# Patient Record
Sex: FEMALE | Race: WHITE | NOT HISPANIC OR LATINO | URBAN - NONMETROPOLITAN AREA
[De-identification: names, ages, dates, MRNs, and addresses within clinical notes are randomized per-mention and may not be internally consistent; named-entity substitution may affect disease eponyms.]

---

## 2020-06-25 ENCOUNTER — OFFICE VISIT (OUTPATIENT)
Dept: URBAN - NONMETROPOLITAN AREA CLINIC 13 | Facility: CLINIC | Age: 65
End: 2020-06-25

## 2020-06-25 ENCOUNTER — OFFICE VISIT (OUTPATIENT)
Dept: URBAN - NONMETROPOLITAN AREA CLINIC 13 | Facility: CLINIC | Age: 65
End: 2020-06-25
Payer: MEDICARE

## 2020-06-25 DIAGNOSIS — K57.32 DIVERTICULITIS, COLON: ICD-10-CM

## 2020-06-25 DIAGNOSIS — R19.7 DIARRHEA: ICD-10-CM

## 2020-06-25 DIAGNOSIS — Z83.71 FAMILY HISTORY OF COLONIC POLYPS: ICD-10-CM

## 2020-06-25 DIAGNOSIS — K21.9 GERD (GASTROESOPHAGEAL REFLUX DISEASE): ICD-10-CM

## 2020-06-25 PROCEDURE — G8427 DOCREV CUR MEDS BY ELIG CLIN: HCPCS | Performed by: INTERNAL MEDICINE

## 2020-06-25 PROCEDURE — 99213 OFFICE O/P EST LOW 20 MIN: CPT | Performed by: INTERNAL MEDICINE

## 2020-06-25 PROCEDURE — G8417 CALC BMI ABV UP PARAM F/U: HCPCS | Performed by: INTERNAL MEDICINE

## 2020-06-25 PROCEDURE — G9903 PT SCRN TBCO ID AS NON USER: HCPCS | Performed by: INTERNAL MEDICINE

## 2020-06-25 PROCEDURE — 1036F TOBACCO NON-USER: CPT | Performed by: INTERNAL MEDICINE

## 2020-06-25 PROCEDURE — 3017F COLORECTAL CA SCREEN DOC REV: CPT | Performed by: INTERNAL MEDICINE

## 2020-06-25 RX ORDER — FAMOTIDINE 40 MG/1
TAKE 1 TABLET (40 MG) BY ORAL ROUTE ONCE DAILY AT BEDTIME FOR 90 DAYS TABLET, FILM COATED ORAL 1
Qty: 90 | Refills: 3 | Status: ACTIVE | COMMUNITY
Start: 2019-11-14 | End: 2020-11-08

## 2020-06-25 RX ORDER — DEXLANSOPRAZOLE 60 MG/1
TAKE 1 CAPSULE (60 MG) BY ORAL ROUTE ONCE DAILY FOR 90 DAYS CAPSULE, DELAYED RELEASE ORAL 1
Qty: 90 | Refills: 3 | Status: ACTIVE | COMMUNITY
Start: 2019-11-14 | End: 2020-11-08

## 2020-06-25 RX ORDER — FLUCONAZOLE 200 MG/1
TAKE 1 TABLET (200 MG) BY ORAL ROUTE ONCE DAILY TABLET ORAL 1
Qty: 0 | Refills: 0 | Status: ACTIVE | COMMUNITY
Start: 1900-01-01

## 2020-06-25 RX ORDER — ESTRADIOL 2 MG/1
TAKE 1/2 TABLET (2 MG) BY ORAL ROUTE ONCE DAILY BID TABLET ORAL 1
Qty: 0 | Refills: 0 | Status: ACTIVE | COMMUNITY
Start: 1900-01-01

## 2020-06-25 NOTE — HPI-TODAY'S VISIT:
Patient returns for follow-up of recurrent small bowel bacterial overgrowth that response to treatment with Xifaxan.  She keeps a prescription of Xifaxan on hand and treats as needed.  She has had 2 episodes in the last 6 months.  Her last episode was recently and she finished the Xifaxan last Sunday. In between episodes her bowel movements are normal.  She has solid formed stool on a daily basis.  She has no change in her bowel habits.  She has no bleeding.  She denies abdominal pain. Reflux has been controlled with Dexilant and Pepcid at night.  She has no chest pain.  She denies dysphagia.  She denies extra esophageal symptoms.  There is no abdominal pain, nausea or vomiting.  Her appetite and weight are stable. She has a recent history of iron deficiency and has been treated with iron.

## 2020-11-02 ENCOUNTER — ERX REFILL RESPONSE (OUTPATIENT)
Age: 65
End: 2020-11-02

## 2020-11-02 RX ORDER — FAMOTIDINE 20 MG/1
TAKE 2 TABLETS (40 MG) AT BEDTIME TABLET, FILM COATED ORAL
Qty: 180 | Refills: 2

## 2020-12-01 ENCOUNTER — ERX REFILL RESPONSE (OUTPATIENT)
Age: 65
End: 2020-12-01

## 2020-12-01 RX ORDER — DEXLANSOPRAZOLE 60 MG/1
TAKE 1 CAPSULE ONCE DAILY CAPSULE, DELAYED RELEASE ORAL
Qty: 90 CAPSULES | Refills: 1

## 2021-05-03 ENCOUNTER — ERX REFILL RESPONSE (OUTPATIENT)
Dept: URBAN - NONMETROPOLITAN AREA CLINIC 2 | Facility: CLINIC | Age: 66
End: 2021-05-03

## 2021-05-03 RX ORDER — DEXLANSOPRAZOLE 60 MG/1
TAKE 1 CAPSULE ONCE DAILY CAPSULE, DELAYED RELEASE ORAL
Qty: 90 CAPSULES | Refills: 0

## 2021-06-15 ENCOUNTER — ERX REFILL RESPONSE (OUTPATIENT)
Dept: URBAN - NONMETROPOLITAN AREA CLINIC 2 | Facility: CLINIC | Age: 66
End: 2021-06-15

## 2021-06-15 RX ORDER — FAMOTIDINE 20 MG/1
TAKE 2 TABLETS (40 MG) AT BEDTIME TABLET, FILM COATED ORAL
Qty: 180 | Refills: 2

## 2021-09-07 ENCOUNTER — ERX REFILL RESPONSE (OUTPATIENT)
Dept: URBAN - NONMETROPOLITAN AREA CLINIC 2 | Facility: CLINIC | Age: 66
End: 2021-09-07

## 2021-09-07 RX ORDER — DEXLANSOPRAZOLE 60 MG/1
TAKE 1 CAPSULE BY MOUTH ONCE DAILY CAPSULE, DELAYED RELEASE ORAL
Qty: 90 EACH | Refills: 1 | OUTPATIENT

## 2021-09-07 RX ORDER — DEXLANSOPRAZOLE 60 MG/1
TAKE 1 CAPSULE ONCE DAILY CAPSULE, DELAYED RELEASE ORAL
Qty: 90 CAPSULES | Refills: 0 | OUTPATIENT

## 2021-09-16 ENCOUNTER — OFFICE VISIT (OUTPATIENT)
Dept: URBAN - NONMETROPOLITAN AREA CLINIC 13 | Facility: CLINIC | Age: 66
End: 2021-09-16
Payer: MEDICARE

## 2021-09-16 ENCOUNTER — WEB ENCOUNTER (OUTPATIENT)
Dept: URBAN - NONMETROPOLITAN AREA CLINIC 13 | Facility: CLINIC | Age: 66
End: 2021-09-16

## 2021-09-16 DIAGNOSIS — K21.9 GASTROESOPHAGEAL REFLUX: ICD-10-CM

## 2021-09-16 DIAGNOSIS — K57.10 ILEAL DIVERTICULUM: ICD-10-CM

## 2021-09-16 DIAGNOSIS — R19.7 DIARRHEA, UNSPECIFIED TYPE: ICD-10-CM

## 2021-09-16 DIAGNOSIS — K76.89 LIVER CYST: ICD-10-CM

## 2021-09-16 DIAGNOSIS — K58.9 IBS (IRRITABLE BOWEL SYNDROME): ICD-10-CM

## 2021-09-16 DIAGNOSIS — K57.92 DIVERTICULITIS: ICD-10-CM

## 2021-09-16 DIAGNOSIS — R10.9 ABDOMINAL PAIN: ICD-10-CM

## 2021-09-16 DIAGNOSIS — Z83.71 FAMILY HISTORY OF COLONIC POLYPS: ICD-10-CM

## 2021-09-16 DIAGNOSIS — K57.90 DIVERTICULOSIS: ICD-10-CM

## 2021-09-16 PROCEDURE — 99214 OFFICE O/P EST MOD 30 MIN: CPT | Performed by: INTERNAL MEDICINE

## 2021-09-16 RX ORDER — DEXLANSOPRAZOLE 60 MG/1
TAKE 1 CAPSULE BY MOUTH ONCE DAILY CAPSULE, DELAYED RELEASE ORAL
Qty: 90 EACH | Refills: 1 | Status: ACTIVE | COMMUNITY

## 2021-09-16 RX ORDER — ESTRADIOL 2 MG/1
TAKE 1/2 TABLET (2 MG) BY ORAL ROUTE ONCE DAILY BID TABLET ORAL 1
Qty: 0 | Refills: 0 | Status: ACTIVE | COMMUNITY
Start: 1900-01-01

## 2021-09-16 RX ORDER — FLUCONAZOLE 200 MG/1
TAKE 1 TABLET (200 MG) BY ORAL ROUTE ONCE DAILY TABLET ORAL 1
Qty: 0 | Refills: 0 | Status: ACTIVE | COMMUNITY
Start: 1900-01-01

## 2021-09-16 RX ORDER — FAMOTIDINE 20 MG/1
TAKE 2 TABLETS (40 MG) AT BEDTIME TABLET, FILM COATED ORAL
Qty: 180 | Refills: 2 | Status: ACTIVE | COMMUNITY

## 2021-09-16 NOTE — HPI-TODAY'S VISIT:
Patient returns for her annual follow-up for IBS-D.  Patient states that she added Florastor to the align and has done much better this year.  She states she has not had to take the Xifaxan at all.  She is currently having a bowel movement daily.  Stools can be loose and somewhat urgent at times.  She is taking Metamucil daily.  She is noted that if she eats "spicy" foods or high fat foods she will develop diarrhea.  She has not had her gallbladder out.  Previous small bowel imaging shows ileal diverticula.  She is status post sigmoid resection for diverticulitis. She has no abdominal pain.  She denies reflux symptoms.  Her appetite and weight are stable.

## 2021-09-24 ENCOUNTER — ERX REFILL RESPONSE (OUTPATIENT)
Dept: URBAN - NONMETROPOLITAN AREA CLINIC 2 | Facility: CLINIC | Age: 66
End: 2021-09-24

## 2021-09-24 RX ORDER — DEXLANSOPRAZOLE 60 MG/1
TAKE 1 CAPSULE BY MOUTH ONCE DAILY CAPSULE, DELAYED RELEASE ORAL
Qty: 90 EACH | Refills: 4 | OUTPATIENT

## 2021-09-24 RX ORDER — DEXLANSOPRAZOLE 60 MG/1
TAKE 1 CAPSULE BY MOUTH ONCE DAILY CAPSULE, DELAYED RELEASE ORAL
Qty: 90 EACH | Refills: 1 | OUTPATIENT

## 2022-03-07 ENCOUNTER — ERX REFILL RESPONSE (OUTPATIENT)
Dept: URBAN - NONMETROPOLITAN AREA CLINIC 2 | Facility: CLINIC | Age: 67
End: 2022-03-07

## 2022-03-07 RX ORDER — FAMOTIDINE 20 MG/1
TAKE 2 TABLETS (40 MG) AT BEDTIME TABLET, FILM COATED ORAL
Qty: 180 | Refills: 2 | OUTPATIENT

## 2022-03-07 RX ORDER — FAMOTIDINE 20 MG/1
TAKE 2 TABLETS BY MOUTH AT BEDTIME TABLET, FILM COATED ORAL
Qty: 180 TABLET | Refills: 3 | OUTPATIENT

## 2022-03-18 ENCOUNTER — TELEPHONE ENCOUNTER (OUTPATIENT)
Dept: URBAN - NONMETROPOLITAN AREA CLINIC 13 | Facility: CLINIC | Age: 67
End: 2022-03-18

## 2022-03-18 RX ORDER — RIFAXIMIN 550 MG/1
1 TABLET TABLET ORAL THREE TIMES A DAY
Qty: 42 TABLET | Refills: 3 | OUTPATIENT
Start: 2022-03-21 | End: 2022-05-16

## 2022-06-22 ENCOUNTER — CLAIMS CREATED FROM THE CLAIM WINDOW (OUTPATIENT)
Dept: URBAN - NONMETROPOLITAN AREA CLINIC 2 | Facility: CLINIC | Age: 67
End: 2022-06-22
Payer: MEDICARE

## 2022-06-22 VITALS
BODY MASS INDEX: 24.66 KG/M2 | HEART RATE: 74 BPM | HEIGHT: 65 IN | WEIGHT: 148 LBS | SYSTOLIC BLOOD PRESSURE: 158 MMHG | DIASTOLIC BLOOD PRESSURE: 93 MMHG

## 2022-06-22 DIAGNOSIS — Z83.71 FAMILY HISTORY OF COLONIC POLYPS: ICD-10-CM

## 2022-06-22 DIAGNOSIS — K57.10 ILEAL DIVERTICULUM: ICD-10-CM

## 2022-06-22 DIAGNOSIS — K21.9 GASTROESOPHAGEAL REFLUX: ICD-10-CM

## 2022-06-22 DIAGNOSIS — K57.90 DIVERTICULOSIS: ICD-10-CM

## 2022-06-22 DIAGNOSIS — R10.11 RUQ ABDOMINAL PAIN: ICD-10-CM

## 2022-06-22 DIAGNOSIS — K57.92 DIVERTICULITIS: ICD-10-CM

## 2022-06-22 DIAGNOSIS — R19.7 DIARRHEA, UNSPECIFIED TYPE: ICD-10-CM

## 2022-06-22 DIAGNOSIS — K76.89 LIVER CYST: ICD-10-CM

## 2022-06-22 DIAGNOSIS — R10.9 ABDOMINAL PAIN: ICD-10-CM

## 2022-06-22 DIAGNOSIS — K58.9 IBS (IRRITABLE BOWEL SYNDROME): ICD-10-CM

## 2022-06-22 PROCEDURE — 99214 OFFICE O/P EST MOD 30 MIN: CPT | Performed by: NURSE PRACTITIONER

## 2022-06-22 RX ORDER — FAMOTIDINE 20 MG/1
TAKE 2 TABLETS BY MOUTH AT BEDTIME TABLET, FILM COATED ORAL
Qty: 180 TABLET | Refills: 3 | Status: ACTIVE | COMMUNITY

## 2022-06-22 RX ORDER — FLUCONAZOLE 200 MG/1
TAKE 1 TABLET (200 MG) BY ORAL ROUTE ONCE DAILY TABLET ORAL 1
Qty: 0 | Refills: 0 | Status: ACTIVE | COMMUNITY
Start: 1900-01-01

## 2022-06-22 RX ORDER — HYOSCYAMINE SULFATE 0.125 MG
1 TABLET ON THE TONGUE AND ALLOW TO DISSOLVE  AS NEEDED FOR ABDOMINAL PAIN TABLET,DISINTEGRATING ORAL
Qty: 30 LOZENGE | Refills: 6 | OUTPATIENT
Start: 2022-06-22 | End: 2023-01-17

## 2022-06-22 RX ORDER — ESTRADIOL 2 MG/1
TAKE 1/2 TABLET (2 MG) BY ORAL ROUTE ONCE DAILY BID TABLET ORAL 1
Qty: 0 | Refills: 0 | Status: ACTIVE | COMMUNITY
Start: 1900-01-01

## 2022-06-22 RX ORDER — DEXLANSOPRAZOLE 60 MG/1
TAKE 1 CAPSULE BY MOUTH ONCE DAILY CAPSULE, DELAYED RELEASE ORAL
Qty: 90 EACH | Refills: 4 | Status: ACTIVE | COMMUNITY

## 2022-06-22 NOTE — HPI-TODAY'S VISIT:
9/21/21 Patient returns for her annual follow-up for IBS-D.  Patient states that she added Florastor to the align and has done much better this year.  She states she has not had to take the Xifaxan at all.  She is currently having a bowel movement daily.  Stools can be loose and somewhat urgent at times.  She is taking Metamucil daily.  She is noted that if she eats "spicy" foods or high fat foods she will develop diarrhea.  She has not had her gallbladder out.  Previous small bowel imaging shows ileal diverticula.  She is status post sigmoid resection for diverticulitis. She has no abdominal pain.  She denies reflux symptoms.  Her appetite and weight are stable.  6/22/2022 Ms. Monique is a 67-year-old female previously followed by Dr. Rubio for IBS D and diverticular disease.  She presents today with acute onset 5-hour episode of right upper quad abdominal pain.  It is currently relieved, but she is very fearful of this returning as it was very sharp when it occurred.  She wonders about gallbladder disease as she has frequent intolerance to high-fat foods.  She has had a gallbladder ultrasound before and has been unrevealing sb

## 2022-06-27 ENCOUNTER — TELEPHONE ENCOUNTER (OUTPATIENT)
Dept: URBAN - METROPOLITAN AREA CLINIC 92 | Facility: CLINIC | Age: 67
End: 2022-06-27

## 2022-06-27 RX ORDER — DICYCLOMINE HYDROCHLORIDE 10 MG/1
1 CAPSULES CAPSULE ORAL
Qty: 90 CAPSULE | Refills: 3 | OUTPATIENT

## 2022-08-17 ENCOUNTER — OFFICE VISIT (OUTPATIENT)
Dept: URBAN - NONMETROPOLITAN AREA CLINIC 2 | Facility: CLINIC | Age: 67
End: 2022-08-17
Payer: MEDICARE

## 2022-08-17 VITALS
HEART RATE: 75 BPM | TEMPERATURE: 97 F | SYSTOLIC BLOOD PRESSURE: 142 MMHG | BODY MASS INDEX: 24.99 KG/M2 | WEIGHT: 150 LBS | HEIGHT: 65 IN | DIASTOLIC BLOOD PRESSURE: 90 MMHG

## 2022-08-17 DIAGNOSIS — K57.92 DIVERTICULITIS: ICD-10-CM

## 2022-08-17 DIAGNOSIS — R10.9 ABDOMINAL PAIN: ICD-10-CM

## 2022-08-17 DIAGNOSIS — R10.11 RUQ ABDOMINAL PAIN: ICD-10-CM

## 2022-08-17 DIAGNOSIS — R19.7 DIARRHEA, UNSPECIFIED TYPE: ICD-10-CM

## 2022-08-17 DIAGNOSIS — Z83.71 FAMILY HISTORY OF COLONIC POLYPS: ICD-10-CM

## 2022-08-17 DIAGNOSIS — K57.10 ILEAL DIVERTICULUM: ICD-10-CM

## 2022-08-17 DIAGNOSIS — K57.90 DIVERTICULOSIS: ICD-10-CM

## 2022-08-17 DIAGNOSIS — K76.89 LIVER CYST: ICD-10-CM

## 2022-08-17 DIAGNOSIS — K58.9 IBS (IRRITABLE BOWEL SYNDROME): ICD-10-CM

## 2022-08-17 DIAGNOSIS — K21.9 GASTROESOPHAGEAL REFLUX: ICD-10-CM

## 2022-08-17 PROBLEM — 62315008: Status: ACTIVE | Noted: 2021-09-16

## 2022-08-17 PROCEDURE — 99213 OFFICE O/P EST LOW 20 MIN: CPT | Performed by: NURSE PRACTITIONER

## 2022-08-17 RX ORDER — DEXLANSOPRAZOLE 60 MG/1
TAKE 1 CAPSULE BY MOUTH ONCE DAILY CAPSULE, DELAYED RELEASE ORAL
Qty: 90 EACH | Refills: 4 | Status: ACTIVE | COMMUNITY

## 2022-08-17 RX ORDER — ESTRADIOL 2 MG/1
TAKE 1/2 TABLET (2 MG) BY ORAL ROUTE ONCE DAILY BID TABLET ORAL 1
Qty: 0 | Refills: 0 | Status: ACTIVE | COMMUNITY
Start: 1900-01-01

## 2022-08-17 RX ORDER — FAMOTIDINE 20 MG/1
TAKE 2 TABLETS BY MOUTH AT BEDTIME TABLET, FILM COATED ORAL
Qty: 180 TABLET | Refills: 3

## 2022-08-17 RX ORDER — FAMOTIDINE 20 MG/1
TAKE 2 TABLETS BY MOUTH AT BEDTIME TABLET, FILM COATED ORAL
Qty: 180 TABLET | Refills: 3 | Status: ACTIVE | COMMUNITY

## 2022-08-17 RX ORDER — PANTOPRAZOLE SODIUM 40 MG/1
1 TABLET TABLET, DELAYED RELEASE ORAL TWICE DAILY
Qty: 60 TABLET | Refills: 6 | OUTPATIENT
Start: 2022-08-17

## 2022-08-17 RX ORDER — DEXLANSOPRAZOLE 60 MG/1
TAKE 1 CAPSULE ONCE DAILY CAPSULE, DELAYED RELEASE ORAL
Qty: 90 CAPSULES | Refills: 1 | Status: DISCONTINUED | COMMUNITY

## 2022-08-17 RX ORDER — FAMOTIDINE 20 MG/1
TAKE 2 TABLETS (40 MG) AT BEDTIME TABLET, FILM COATED ORAL
Qty: 180 | Refills: 2 | Status: DISCONTINUED | COMMUNITY

## 2022-08-17 RX ORDER — HYOSCYAMINE SULFATE 0.125 MG
1 TABLET ON THE TONGUE AND ALLOW TO DISSOLVE  AS NEEDED FOR ABDOMINAL PAIN TABLET,DISINTEGRATING ORAL
Qty: 30 LOZENGE | Refills: 6 | OUTPATIENT

## 2022-08-17 RX ORDER — FLUCONAZOLE 200 MG/1
TAKE 1 TABLET (200 MG) BY ORAL ROUTE ONCE DAILY TABLET ORAL 1
Qty: 0 | Refills: 0 | Status: ACTIVE | COMMUNITY
Start: 1900-01-01

## 2022-08-17 RX ORDER — HYOSCYAMINE SULFATE 0.125 MG
1 TABLET ON THE TONGUE AND ALLOW TO DISSOLVE  AS NEEDED FOR ABDOMINAL PAIN TABLET,DISINTEGRATING ORAL
Qty: 30 LOZENGE | Refills: 6 | Status: ACTIVE | COMMUNITY
Start: 2022-06-22 | End: 2023-01-17

## 2022-08-17 RX ORDER — DICYCLOMINE HYDROCHLORIDE 10 MG/1
1 CAPSULES CAPSULE ORAL
Qty: 90 CAPSULE | Refills: 3 | Status: ACTIVE | COMMUNITY

## 2022-08-17 RX ORDER — DEXLANSOPRAZOLE 60 MG/1
TAKE 1 CAPSULE BY MOUTH ONCE DAILY CAPSULE, DELAYED RELEASE ORAL
Qty: 90 EACH | Refills: 1 | Status: DISCONTINUED | COMMUNITY

## 2022-08-17 NOTE — HPI-TODAY'S VISIT:
9/21/21 Patient returns for her annual follow-up for IBS-D.  Patient states that she added Florastor to the align and has done much better this year.  She states she has not had to take the Xifaxan at all.  She is currently having a bowel movement daily.  Stools can be loose and somewhat urgent at times.  She is taking Metamucil daily.  She is noted that if she eats "spicy" foods or high fat foods she will develop diarrhea.  She has not had her gallbladder out.  Previous small bowel imaging shows ileal diverticula.  She is status post sigmoid resection for diverticulitis. She has no abdominal pain.  She denies reflux symptoms.  Her appetite and weight are stable.  8/17/22 Ms. Monique is a 67-year-old female previously followed by Dr. Rubio for IBS D and diverticular disease.  She presents today with acute onset 5-hour episode of right upper quad abdominal pain.  It is currently relieved, but she is very fearful of this returning as it was very sharp when it occurred.    She has had a gallbladder ultrasound before and has been unrevealing. 2022 HIDA 92%. sb

## 2022-11-17 ENCOUNTER — TELEPHONE ENCOUNTER (OUTPATIENT)
Dept: URBAN - METROPOLITAN AREA CLINIC 92 | Facility: CLINIC | Age: 67
End: 2022-11-17

## 2022-11-17 RX ORDER — DEXLANSOPRAZOLE 60 MG/1
TAKE 1 CAPSULE BY MOUTH ONCE DAILY CAPSULE, DELAYED RELEASE ORAL
Qty: 90 EACH | Refills: 4

## 2023-01-18 ENCOUNTER — OFFICE VISIT (OUTPATIENT)
Dept: URBAN - NONMETROPOLITAN AREA CLINIC 2 | Facility: CLINIC | Age: 68
End: 2023-01-18
Payer: MEDICARE

## 2023-01-18 VITALS
WEIGHT: 150 LBS | SYSTOLIC BLOOD PRESSURE: 169 MMHG | BODY MASS INDEX: 24.99 KG/M2 | TEMPERATURE: 97.4 F | DIASTOLIC BLOOD PRESSURE: 98 MMHG | HEIGHT: 65 IN | HEART RATE: 80 BPM

## 2023-01-18 DIAGNOSIS — K57.90 DIVERTICULOSIS: ICD-10-CM

## 2023-01-18 DIAGNOSIS — K76.89 LIVER CYST: ICD-10-CM

## 2023-01-18 DIAGNOSIS — R10.9 ABDOMINAL PAIN: ICD-10-CM

## 2023-01-18 DIAGNOSIS — R19.7 DIARRHEA, UNSPECIFIED TYPE: ICD-10-CM

## 2023-01-18 DIAGNOSIS — K58.9 IBS (IRRITABLE BOWEL SYNDROME): ICD-10-CM

## 2023-01-18 DIAGNOSIS — K57.10 ILEAL DIVERTICULUM: ICD-10-CM

## 2023-01-18 DIAGNOSIS — Z83.71 FAMILY HISTORY OF COLONIC POLYPS: ICD-10-CM

## 2023-01-18 DIAGNOSIS — K57.92 DIVERTICULITIS: ICD-10-CM

## 2023-01-18 DIAGNOSIS — K21.9 GASTROESOPHAGEAL REFLUX: ICD-10-CM

## 2023-01-18 DIAGNOSIS — R10.11 RUQ ABDOMINAL PAIN: ICD-10-CM

## 2023-01-18 PROBLEM — 197085005: Status: ACTIVE | Noted: 2021-09-16

## 2023-01-18 PROBLEM — 85057007: Status: ACTIVE | Noted: 2021-09-16

## 2023-01-18 PROCEDURE — 99214 OFFICE O/P EST MOD 30 MIN: CPT | Performed by: INTERNAL MEDICINE

## 2023-01-18 RX ORDER — DEXLANSOPRAZOLE 60 MG/1
TAKE 1 CAPSULE BY MOUTH ONCE DAILY CAPSULE, DELAYED RELEASE ORAL
Qty: 90 EACH | Refills: 4 | Status: DISCONTINUED | COMMUNITY

## 2023-01-18 RX ORDER — FAMOTIDINE 20 MG/1
TAKE 2 TABLETS BY MOUTH AT BEDTIME TABLET, FILM COATED ORAL
Qty: 180 TABLET | Refills: 3

## 2023-01-18 RX ORDER — FLUCONAZOLE 200 MG/1
TAKE 1 TABLET (200 MG) BY ORAL ROUTE ONCE DAILY TABLET ORAL 1
Qty: 0 | Refills: 0 | Status: ACTIVE | COMMUNITY
Start: 1900-01-01

## 2023-01-18 RX ORDER — DICYCLOMINE HYDROCHLORIDE 10 MG/1
1 CAPSULE CAPSULE ORAL THREE TIMES A DAY
Qty: 90 | Refills: 3 | OUTPATIENT
Start: 2023-01-18 | End: 2023-05-18

## 2023-01-18 RX ORDER — PANTOPRAZOLE SODIUM 40 MG/1
1 TABLET TABLET, DELAYED RELEASE ORAL TWICE DAILY
Qty: 60 TABLET | Refills: 6 | OUTPATIENT

## 2023-01-18 RX ORDER — DICYCLOMINE HYDROCHLORIDE 10 MG/1
1 CAPSULES CAPSULE ORAL
Qty: 90 CAPSULE | Refills: 3 | Status: DISCONTINUED | COMMUNITY

## 2023-01-18 RX ORDER — HYOSCYAMINE SULFATE 0.125 MG
1 TABLET ON THE TONGUE AND ALLOW TO DISSOLVE  AS NEEDED FOR ABDOMINAL PAIN TABLET,DISINTEGRATING ORAL
Qty: 30 LOZENGE | Refills: 6 | Status: DISCONTINUED | COMMUNITY

## 2023-01-18 RX ORDER — PANTOPRAZOLE SODIUM 40 MG/1
1 TABLET TABLET, DELAYED RELEASE ORAL TWICE DAILY
Qty: 60 TABLET | Refills: 6 | Status: ACTIVE | COMMUNITY
Start: 2022-08-17

## 2023-01-18 RX ORDER — ZOLPIDEM TARTRATE 10 MG/1
1 TABLET AT BEDTIME AS NEEDED TABLET, FILM COATED ORAL ONCE A DAY
Status: ACTIVE | COMMUNITY

## 2023-01-18 RX ORDER — FAMOTIDINE 20 MG/1
TAKE 2 TABLETS BY MOUTH AT BEDTIME TABLET, FILM COATED ORAL
Qty: 180 TABLET | Refills: 3 | Status: ACTIVE | COMMUNITY

## 2023-01-18 RX ORDER — ESTRADIOL 2 MG/1
TAKE 1/2 TABLET (2 MG) BY ORAL ROUTE ONCE DAILY BID TABLET ORAL 1
Qty: 0 | Refills: 0 | Status: ACTIVE | COMMUNITY
Start: 1900-01-01

## 2023-01-18 RX ORDER — CELECOXIB 200 MG/1
1 CAPSULE WITH FOOD CAPSULE ORAL ONCE A DAY
Status: ACTIVE | COMMUNITY

## 2023-01-18 RX ORDER — HYOSCYAMINE SULFATE 0.125 MG
1 TABLET ON THE TONGUE AND ALLOW TO DISSOLVE  AS NEEDED FOR ABDOMINAL PAIN TABLET,DISINTEGRATING ORAL
Qty: 30 LOZENGE | Refills: 6 | OUTPATIENT

## 2023-01-18 NOTE — HPI-TODAY'S VISIT:
9/21/21 Patient returns for her annual follow-up for IBS-D.  Patient states that she added Florastor to the align and has done much better this year.  She states she has not had to take the Xifaxan at all.  She is currently having a bowel movement daily.  Stools can be loose and somewhat urgent at times.  She is taking Metamucil daily.  She is noted that if she eats "spicy" foods or high fat foods she will develop diarrhea.  She has not had her gallbladder out.  Previous small bowel imaging shows ileal diverticula.  She is status post sigmoid resection for diverticulitis. She has no abdominal pain.  She denies reflux symptoms.  Her appetite and weight are stable.  8/17/22 Ms. Monique is a 67-year-old female previously followed by Dr. uRbio for IBS D and diverticular disease.  She presents today with acute onset 5-hour episode of right upper quad abdominal pain.  It is currently relieved, but she is very fearful of this returning as it was very sharp when it occurred.    She has had a gallbladder ultrasound before and has been unrevealing. 2022 HIDA 92%. sb  1/18/23: Ms. Monique returns for follow-up of RUQ abdominal pain and IBS-D.  Since her last clinic visit, she has been doing ok.  She has difficulties with post-prandial urgency.  Her RUQ pain has resolved itself.  She continues to take both pantoprazole BID and Pepcid and complains of waking up in the early AM with burning in her throat.

## 2023-02-07 PROBLEM — 235595009 GASTROESOPHAGEAL REFLUX DISEASE: Status: ACTIVE | Noted: 2023-02-07

## 2023-02-23 ENCOUNTER — OFFICE VISIT (OUTPATIENT)
Dept: URBAN - NONMETROPOLITAN AREA SURGERY CENTER 1 | Facility: SURGERY CENTER | Age: 68
End: 2023-02-23
Payer: MEDICARE

## 2023-02-23 ENCOUNTER — CLAIMS CREATED FROM THE CLAIM WINDOW (OUTPATIENT)
Dept: URBAN - METROPOLITAN AREA CLINIC 4 | Facility: CLINIC | Age: 68
End: 2023-02-23
Payer: MEDICARE

## 2023-02-23 DIAGNOSIS — K31.7 BENIGN GASTRIC POLYP: ICD-10-CM

## 2023-02-23 DIAGNOSIS — K21.9 GASTRO-ESOPHAGEAL REFLUX DISEASE WITHOUT ESOPHAGITIS: ICD-10-CM

## 2023-02-23 DIAGNOSIS — K31.7 POLYP OF STOMACH AND DUODENUM: ICD-10-CM

## 2023-02-23 DIAGNOSIS — K21.9 ACID REFLUX: ICD-10-CM

## 2023-02-23 PROCEDURE — 88305 TISSUE EXAM BY PATHOLOGIST: CPT | Performed by: PATHOLOGY

## 2023-02-23 PROCEDURE — G8907 PT DOC NO EVENTS ON DISCHARG: HCPCS | Performed by: INTERNAL MEDICINE

## 2023-02-23 PROCEDURE — 88313 SPECIAL STAINS GROUP 2: CPT | Performed by: PATHOLOGY

## 2023-02-23 PROCEDURE — 43239 EGD BIOPSY SINGLE/MULTIPLE: CPT | Performed by: INTERNAL MEDICINE

## 2023-02-23 PROCEDURE — 88312 SPECIAL STAINS GROUP 1: CPT | Performed by: PATHOLOGY

## 2023-03-14 ENCOUNTER — TELEPHONE ENCOUNTER (OUTPATIENT)
Dept: URBAN - NONMETROPOLITAN AREA CLINIC 2 | Facility: CLINIC | Age: 68
End: 2023-03-14

## 2023-03-14 RX ORDER — PANTOPRAZOLE SODIUM 40 MG/1
1 TABLET TABLET, DELAYED RELEASE ORAL TWICE DAILY
Qty: 60 TABLET | Refills: 6

## 2023-05-26 ENCOUNTER — CLAIMS CREATED FROM THE CLAIM WINDOW (OUTPATIENT)
Dept: URBAN - NONMETROPOLITAN AREA CLINIC 2 | Facility: CLINIC | Age: 68
End: 2023-05-26
Payer: MEDICARE

## 2023-05-26 VITALS
TEMPERATURE: 97.7 F | DIASTOLIC BLOOD PRESSURE: 108 MMHG | SYSTOLIC BLOOD PRESSURE: 169 MMHG | HEIGHT: 65 IN | BODY MASS INDEX: 24.99 KG/M2 | HEART RATE: 78 BPM | WEIGHT: 150 LBS

## 2023-05-26 DIAGNOSIS — R10.9 ABDOMINAL PAIN: ICD-10-CM

## 2023-05-26 DIAGNOSIS — K21.9 GASTROESOPHAGEAL REFLUX DISEASE WITHOUT ESOPHAGITIS: ICD-10-CM

## 2023-05-26 DIAGNOSIS — K76.89 LIVER CYST: ICD-10-CM

## 2023-05-26 DIAGNOSIS — R10.11 RUQ ABDOMINAL PAIN: ICD-10-CM

## 2023-05-26 DIAGNOSIS — K58.9 IBS (IRRITABLE BOWEL SYNDROME): ICD-10-CM

## 2023-05-26 DIAGNOSIS — Z83.71 FAMILY HISTORY OF COLONIC POLYPS: ICD-10-CM

## 2023-05-26 DIAGNOSIS — R10.84 ABDOMINAL CRAMPING, GENERALIZED: ICD-10-CM

## 2023-05-26 PROBLEM — 266435005: Status: ACTIVE | Noted: 2023-05-26

## 2023-05-26 PROCEDURE — 99214 OFFICE O/P EST MOD 30 MIN: CPT | Performed by: NURSE PRACTITIONER

## 2023-05-26 RX ORDER — DICYCLOMINE HYDROCHLORIDE 10 MG/1
1 CAPSULES CAPSULE ORAL THREE TIMES A DAY
Qty: 90 CAPSULE | Refills: 3 | OUTPATIENT
Start: 2023-05-26 | End: 2023-09-23

## 2023-05-26 RX ORDER — PANTOPRAZOLE SODIUM 40 MG/1
1 TABLET TABLET, DELAYED RELEASE ORAL ONCE A DAY
Qty: 90 TABLET | Refills: 3 | OUTPATIENT
Start: 2023-05-26

## 2023-05-26 RX ORDER — ESTRADIOL 2 MG/1
TAKE 1/2 TABLET (2 MG) BY ORAL ROUTE ONCE DAILY BID TABLET ORAL 1
Qty: 0 | Refills: 0 | Status: ACTIVE | COMMUNITY
Start: 1900-01-01

## 2023-05-26 RX ORDER — FAMOTIDINE 40 MG/1
1 TABLET TABLET, FILM COATED ORAL TWICE A DAY
Qty: 180 TABLET | Refills: 3 | OUTPATIENT
Start: 2023-05-26

## 2023-05-26 RX ORDER — FAMOTIDINE 20 MG/1
TAKE 2 TABLETS BY MOUTH AT BEDTIME TABLET, FILM COATED ORAL
Qty: 180 TABLET | Refills: 3 | Status: ACTIVE | COMMUNITY

## 2023-05-26 RX ORDER — FLUCONAZOLE 200 MG/1
TAKE 1 TABLET (200 MG) BY ORAL ROUTE ONCE DAILY TABLET ORAL 1
Qty: 0 | Refills: 0 | Status: ACTIVE | COMMUNITY
Start: 1900-01-01

## 2023-05-26 RX ORDER — PANTOPRAZOLE SODIUM 40 MG/1
1 TABLET TABLET, DELAYED RELEASE ORAL TWICE DAILY
Qty: 60 TABLET | Refills: 6 | Status: ACTIVE | COMMUNITY

## 2023-05-26 RX ORDER — ZOLPIDEM TARTRATE 10 MG/1
1 TABLET AT BEDTIME AS NEEDED TABLET, FILM COATED ORAL ONCE A DAY
Status: ACTIVE | COMMUNITY

## 2023-05-26 RX ORDER — HYOSCYAMINE SULFATE 0.125 MG
1 TABLET ON THE TONGUE AND ALLOW TO DISSOLVE  AS NEEDED FOR ABDOMINAL PAIN TABLET,DISINTEGRATING ORAL
Qty: 30 LOZENGE | Refills: 6 | Status: ACTIVE | COMMUNITY

## 2023-05-26 RX ORDER — CELECOXIB 200 MG/1
1 CAPSULE WITH FOOD CAPSULE ORAL ONCE A DAY
Status: ACTIVE | COMMUNITY

## 2023-05-26 NOTE — HPI-TODAY'S VISIT:
9/21/21 Patient returns for her annual follow-up for IBS-D.  Patient states that she added Florastor to the align and has done much better this year.  She states she has not had to take the Xifaxan at all.  She is currently having a bowel movement daily.  Stools can be loose and somewhat urgent at times.  She is taking Metamucil daily.  She is noted that if she eats "spicy" foods or high fat foods she will develop diarrhea.  She has not had her gallbladder out.  Previous small bowel imaging shows ileal diverticula.  She is status post sigmoid resection for diverticulitis. She has no abdominal pain.  She denies reflux symptoms.  Her appetite and weight are stable.  8/17/22 Ms. Monique is a 67-year-old female previously followed by Dr. Rubio for IBS D and diverticular disease.  She presents today with acute onset 5-hour episode of right upper quad abdominal pain.  It is currently relieved, but she is very fearful of this returning as it was very sharp when it occurred.    She has had a gallbladder ultrasound before and has been unrevealing. 2022 HIDA 92%. sb  1/18/23: Ms. Monique returns for follow-up of RUQ abdominal pain and IBS-D.  Since her last clinic visit, she has been doing ok.  She has difficulties with post-prandial urgency.  Her RUQ pain has resolved itself.  She continues to take both pantoprazole BID and Pepcid and complains of waking up in the early AM with burning in her throat.  3/23 EGD with 3 cm HH, salmon mucosa- but path didn't show barretts.  Today, Ms Monique returns for GERD. reports some nausea/indigestion in am. overall, doing well. worried about loose stool while on vacation. seems to be doing ok today. SB

## 2023-11-27 ENCOUNTER — OFFICE VISIT (OUTPATIENT)
Dept: URBAN - NONMETROPOLITAN AREA CLINIC 13 | Facility: CLINIC | Age: 68
End: 2023-11-27
Payer: MEDICARE

## 2023-11-27 ENCOUNTER — TELEPHONE ENCOUNTER (OUTPATIENT)
Dept: URBAN - NONMETROPOLITAN AREA CLINIC 2 | Facility: CLINIC | Age: 68
End: 2023-11-27

## 2023-11-27 ENCOUNTER — DASHBOARD ENCOUNTERS (OUTPATIENT)
Age: 68
End: 2023-11-27

## 2023-11-27 ENCOUNTER — ERX REFILL RESPONSE (OUTPATIENT)
Dept: URBAN - NONMETROPOLITAN AREA CLINIC 2 | Facility: CLINIC | Age: 68
End: 2023-11-27

## 2023-11-27 VITALS
SYSTOLIC BLOOD PRESSURE: 156 MMHG | HEIGHT: 65 IN | WEIGHT: 150 LBS | DIASTOLIC BLOOD PRESSURE: 62 MMHG | BODY MASS INDEX: 24.99 KG/M2 | TEMPERATURE: 98 F | HEART RATE: 64 BPM

## 2023-11-27 DIAGNOSIS — Z83.71 FAMILY HISTORY OF COLONIC POLYPS: ICD-10-CM

## 2023-11-27 DIAGNOSIS — K76.89 LIVER CYST: ICD-10-CM

## 2023-11-27 DIAGNOSIS — R10.84 ABDOMINAL CRAMPING, GENERALIZED: ICD-10-CM

## 2023-11-27 DIAGNOSIS — K58.8 OTHER IRRITABLE BOWEL SYNDROME: ICD-10-CM

## 2023-11-27 PROCEDURE — 99214 OFFICE O/P EST MOD 30 MIN: CPT | Performed by: NURSE PRACTITIONER

## 2023-11-27 RX ORDER — ZOLPIDEM TARTRATE 10 MG/1
1 TABLET AT BEDTIME AS NEEDED TABLET, FILM COATED ORAL ONCE A DAY
Status: ACTIVE | COMMUNITY

## 2023-11-27 RX ORDER — CELECOXIB 200 MG/1
1 CAPSULE WITH FOOD CAPSULE ORAL ONCE A DAY
Status: ACTIVE | COMMUNITY

## 2023-11-27 RX ORDER — PANTOPRAZOLE SODIUM 40 MG/1
1 TABLET TABLET, DELAYED RELEASE ORAL ONCE A DAY
Qty: 90 TABLET | Refills: 3 | OUTPATIENT

## 2023-11-27 RX ORDER — FLUCONAZOLE 200 MG/1
TAKE 1 TABLET (200 MG) BY ORAL ROUTE ONCE DAILY TABLET ORAL 1
Qty: 0 | Refills: 0 | Status: ACTIVE | COMMUNITY
Start: 1900-01-01

## 2023-11-27 RX ORDER — DICYCLOMINE HYDROCHLORIDE 10 MG/1
TAKE 1 CAPSULE BY MOUTH THREE TIMES DAILY CAPSULE ORAL
Qty: 90 CAPSULE | Refills: 4 | OUTPATIENT

## 2023-11-27 RX ORDER — ESTRADIOL 2 MG/1
TAKE 1/2 TABLET (2 MG) BY ORAL ROUTE ONCE DAILY BID TABLET ORAL 1
Qty: 0 | Refills: 0 | Status: ACTIVE | COMMUNITY
Start: 1900-01-01

## 2023-11-27 RX ORDER — FAMOTIDINE 20 MG/1
TAKE 2 TABLETS BY MOUTH AT BEDTIME TABLET, FILM COATED ORAL
Qty: 180 TABLET | Refills: 3 | Status: ACTIVE | COMMUNITY

## 2023-11-27 RX ORDER — PANTOPRAZOLE SODIUM 40 MG/1
1 TABLET TABLET, DELAYED RELEASE ORAL TWICE DAILY
Qty: 60 TABLET | Refills: 6 | Status: ACTIVE | COMMUNITY

## 2023-11-27 RX ORDER — PANTOPRAZOLE SODIUM 40 MG/1
1 TABLET TABLET, DELAYED RELEASE ORAL ONCE A DAY
Qty: 90 TABLET | Refills: 3 | Status: ACTIVE | COMMUNITY
Start: 2023-05-26

## 2023-11-27 RX ORDER — HYOSCYAMINE SULFATE 0.125 MG
1 TABLET ON THE TONGUE AND ALLOW TO DISSOLVE  AS NEEDED FOR ABDOMINAL PAIN TABLET,DISINTEGRATING ORAL
Qty: 30 LOZENGE | Refills: 6 | Status: ACTIVE | COMMUNITY

## 2023-11-27 RX ORDER — FAMOTIDINE 40 MG/1
1 TABLET TABLET, FILM COATED ORAL TWICE A DAY
Qty: 180 TABLET | Refills: 3 | Status: ACTIVE | COMMUNITY
Start: 2023-05-26

## 2023-11-27 RX ORDER — DICYCLOMINE HYDROCHLORIDE 10 MG/1
TAKE 1 CAPSULE BY MOUTH THREE TIMES DAILY CAPSULE ORAL
Qty: 90 CAPSULE | Refills: 3 | OUTPATIENT

## 2023-11-27 RX ORDER — FAMOTIDINE 40 MG/1
1 TABLET TABLET, FILM COATED ORAL TWICE A DAY
Qty: 180 TABLET | Refills: 3 | OUTPATIENT

## 2023-11-27 RX ORDER — RIFAXIMIN 550 MG/1
1 TABLET TABLET ORAL THREE TIMES A DAY
Qty: 42 TABLET | Refills: 0 | OUTPATIENT
Start: 2023-11-27 | End: 2023-12-11

## 2023-11-27 NOTE — HPI-TODAY'S VISIT:
9/21/21 Patient returns for her annual follow-up for IBS-D.  Patient states that she added Florastor to the align and has done much better this year.  She states she has not had to take the Xifaxan at all.  She is currently having a bowel movement daily.  Stools can be loose and somewhat urgent at times.  She is taking Metamucil daily.  She is noted that if she eats "spicy" foods or high fat foods she will develop diarrhea.  She has not had her gallbladder out.  Previous small bowel imaging shows ileal diverticula.  She is status post sigmoid resection for diverticulitis. She has no abdominal pain.  She denies reflux symptoms.  Her appetite and weight are stable.  8/17/22 Ms. Monique is a 67-year-old female previously followed by Dr. Rubio for IBS D and diverticular disease.  She presents today with acute onset 5-hour episode of right upper quad abdominal pain.  It is currently relieved, but she is very fearful of this returning as it was very sharp when it occurred.    She has had a gallbladder ultrasound before and has been unrevealing. 2022 HIDA 92%. sb  1/18/23: Ms. Monique returns for follow-up of RUQ abdominal pain and IBS-D.  Since her last clinic visit, she has been doing ok.  She has difficulties with post-prandial urgency.  Her RUQ pain has resolved itself.  She continues to take both pantoprazole BID and Pepcid and complains of waking up in the early AM with burning in her throat.  3/23 EGD with 3 cm HH, salmon mucosa- but path didn't show barretts.  Today, Ms Monique returns for GERD. this is well-controlled currently. reports PRN justa worked well on her vacation. did have several rounds of antibx and ended up with SIBO symptoms and is s/p xifaxan with relief. SB

## 2023-11-29 ENCOUNTER — TELEPHONE ENCOUNTER (OUTPATIENT)
Dept: URBAN - NONMETROPOLITAN AREA CLINIC 2 | Facility: CLINIC | Age: 68
End: 2023-11-29

## 2024-05-31 ENCOUNTER — ERX REFILL RESPONSE (OUTPATIENT)
Dept: URBAN - NONMETROPOLITAN AREA CLINIC 2 | Facility: CLINIC | Age: 69
End: 2024-05-31

## 2024-05-31 RX ORDER — FAMOTIDINE 40 MG/1
1 TABLET TABLET, FILM COATED ORAL TWICE A DAY
Qty: 180 TABLET | Refills: 3 | OUTPATIENT

## 2024-05-31 RX ORDER — PANTOPRAZOLE SODIUM 40 MG/1
TAKE 1 TABLET BY MOUTH DAILY TABLET, DELAYED RELEASE ORAL
Qty: 90 TABLET | Refills: 3 | OUTPATIENT

## 2024-05-31 RX ORDER — PANTOPRAZOLE SODIUM 40 MG/1
1 TABLET TABLET, DELAYED RELEASE ORAL ONCE A DAY
Qty: 90 TABLET | Refills: 3 | OUTPATIENT

## 2024-05-31 RX ORDER — FAMOTIDINE 40 MG/1
TAKE 1 TABLET BY MOUTH TWICE DAILY TABLET, FILM COATED ORAL
Qty: 180 TABLET | Refills: 3 | OUTPATIENT

## 2024-10-04 ENCOUNTER — OFFICE VISIT (OUTPATIENT)
Dept: URBAN - NONMETROPOLITAN AREA CLINIC 2 | Facility: CLINIC | Age: 69
End: 2024-10-04
Payer: COMMERCIAL

## 2024-10-04 VITALS
HEART RATE: 73 BPM | BODY MASS INDEX: 24.22 KG/M2 | WEIGHT: 145.4 LBS | TEMPERATURE: 97.1 F | SYSTOLIC BLOOD PRESSURE: 159 MMHG | DIASTOLIC BLOOD PRESSURE: 91 MMHG | HEIGHT: 65 IN

## 2024-10-04 DIAGNOSIS — K58.0 IBS-D: ICD-10-CM

## 2024-10-04 DIAGNOSIS — Z90.49 HISTORY OF COLON RESECTION: ICD-10-CM

## 2024-10-04 DIAGNOSIS — R10.9 ABDOMINAL PAIN: ICD-10-CM

## 2024-10-04 DIAGNOSIS — K76.89 LIVER CYST: ICD-10-CM

## 2024-10-04 DIAGNOSIS — K21.9 GASTROESOPHAGEAL REFLUX DISEASE WITHOUT ESOPHAGITIS: ICD-10-CM

## 2024-10-04 DIAGNOSIS — Z83.719 FAMILY HISTORY OF COLON POLYPS, UNSPECIFIED: ICD-10-CM

## 2024-10-04 DIAGNOSIS — R10.11 RUQ ABDOMINAL PAIN: ICD-10-CM

## 2024-10-04 PROBLEM — 427816007: Status: ACTIVE | Noted: 2024-10-04

## 2024-10-04 PROCEDURE — 99214 OFFICE O/P EST MOD 30 MIN: CPT | Performed by: INTERNAL MEDICINE

## 2024-10-04 RX ORDER — FAMOTIDINE 40 MG/1
1 TABLET TABLET, FILM COATED ORAL TWICE A DAY
Qty: 180 TABLET | Refills: 3 | OUTPATIENT

## 2024-10-04 RX ORDER — RIFAXIMIN 550 MG/1
1 TABLET TABLET ORAL THREE TIMES A DAY
Qty: 42 TABLET | Refills: 2 | OUTPATIENT
Start: 2024-10-04 | End: 2024-11-14

## 2024-10-04 RX ORDER — ESTRADIOL 2 MG/1
TAKE 1/2 TABLET (2 MG) BY ORAL ROUTE ONCE DAILY BID TABLET ORAL 1
Qty: 0 | Refills: 0 | Status: ACTIVE | COMMUNITY
Start: 1900-01-01

## 2024-10-04 RX ORDER — CELECOXIB 200 MG/1
1 CAPSULE WITH FOOD CAPSULE ORAL ONCE A DAY
Status: ACTIVE | COMMUNITY

## 2024-10-04 RX ORDER — ZOLPIDEM TARTRATE 10 MG/1
1 TABLET AT BEDTIME AS NEEDED TABLET, FILM COATED ORAL ONCE A DAY
Status: ACTIVE | COMMUNITY

## 2024-10-04 RX ORDER — DICYCLOMINE HYDROCHLORIDE 10 MG/1
TAKE 1 CAPSULE BY MOUTH THREE TIMES DAILY CAPSULE ORAL
Qty: 90 CAPSULE | Refills: 3 | Status: ACTIVE | COMMUNITY

## 2024-10-04 RX ORDER — PANTOPRAZOLE SODIUM 40 MG/1
TAKE 1 TABLET BY MOUTH DAILY TABLET, DELAYED RELEASE ORAL
Qty: 90 TABLET | Refills: 3 | Status: ACTIVE | COMMUNITY

## 2024-10-04 RX ORDER — FAMOTIDINE 20 MG/1
TAKE 2 TABLETS BY MOUTH AT BEDTIME TABLET, FILM COATED ORAL
Qty: 180 TABLET | Refills: 3 | Status: ACTIVE | COMMUNITY

## 2024-10-04 RX ORDER — FAMOTIDINE 40 MG/1
TAKE 1 TABLET BY MOUTH TWICE DAILY TABLET, FILM COATED ORAL
Qty: 180 TABLET | Refills: 3 | Status: ACTIVE | COMMUNITY

## 2024-10-04 RX ORDER — HYOSCYAMINE SULFATE 0.125 MG
1 TABLET ON THE TONGUE AND ALLOW TO DISSOLVE  AS NEEDED FOR ABDOMINAL PAIN TABLET,DISINTEGRATING ORAL
Qty: 30 LOZENGE | Refills: 6 | Status: ACTIVE | COMMUNITY

## 2024-10-04 RX ORDER — FLUCONAZOLE 200 MG/1
TAKE 1 TABLET (200 MG) BY ORAL ROUTE ONCE DAILY TABLET ORAL 1
Qty: 0 | Refills: 0 | Status: ACTIVE | COMMUNITY
Start: 1900-01-01

## 2024-10-04 RX ORDER — PANTOPRAZOLE SODIUM 40 MG/1
1 TABLET TABLET, DELAYED RELEASE ORAL ONCE A DAY
Qty: 90 TABLET | Refills: 3 | OUTPATIENT

## 2024-10-04 NOTE — HPI-TODAY'S VISIT:
9/21/21 Patient returns for her annual follow-up for IBS-D.  Patient states that she added Florastor to the align and has done much better this year.  She states she has not had to take the Xifaxan at all.  She is currently having a bowel movement daily.  Stools can be loose and somewhat urgent at times.  She is taking Metamucil daily.  She is noted that if she eats "spicy" foods or high fat foods she will develop diarrhea.  She has not had her gallbladder out.  Previous small bowel imaging shows ileal diverticula.  She is status post sigmoid resection for diverticulitis. She has no abdominal pain.  She denies reflux symptoms.  Her appetite and weight are stable.  8/17/22 Ms. Monique is a 67-year-old female previously followed by Dr. Rubio for IBS D and diverticular disease.  She presents today with acute onset 5-hour episode of right upper quad abdominal pain.  It is currently relieved, but she is very fearful of this returning as it was very sharp when it occurred.    She has had a gallbladder ultrasound before and has been unrevealing. 2022 HIDA 92%. sb  1/18/23: Ms. Monique returns for follow-up of RUQ abdominal pain and IBS-D.  Since her last clinic visit, she has been doing ok.  She has difficulties with post-prandial urgency.  Her RUQ pain has resolved itself.  She continues to take both pantoprazole BID and Pepcid and complains of waking up in the early AM with burning in her throat.  11/27/23: Today, Ms Monique returns for GERD. this is well-controlled currently. reports PRN bentyl worked well on her vacation. did have several rounds of antibx and ended up with SIBO symptoms and is s/p xifaxan with relief. SB  10/4/24: Ms. Monique returns to GI clinic for follow-up of chronic GERD and IBS-D symptoms.  She is also s/p sigmoid resection for diverticulitis.  She has taken Xifaxan in the past with improvement and uses Levsin PRN.  She has been on pantoprazole and famotidine daily.  She endorses a LLQ abdominal pain syndrome that is mild but persistent.  She is moving her bowels.  Xifaxan provides a lot of relief for her and she asks for a refill today.  She does have a course of it at her house currently that has been filled.  3/23 EGD with 3 cm HH, salmon mucosa- but path didn't show barretts. 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

## 2024-10-07 ENCOUNTER — TELEPHONE ENCOUNTER (OUTPATIENT)
Dept: URBAN - NONMETROPOLITAN AREA CLINIC 2 | Facility: CLINIC | Age: 69
End: 2024-10-07

## 2024-11-14 ENCOUNTER — LAB OUTSIDE AN ENCOUNTER (OUTPATIENT)
Dept: URBAN - NONMETROPOLITAN AREA CLINIC 2 | Facility: CLINIC | Age: 69
End: 2024-11-14

## 2024-11-14 ENCOUNTER — TELEPHONE ENCOUNTER (OUTPATIENT)
Dept: URBAN - NONMETROPOLITAN AREA CLINIC 2 | Facility: CLINIC | Age: 69
End: 2024-11-14

## 2024-11-21 ENCOUNTER — OFFICE VISIT (OUTPATIENT)
Dept: URBAN - NONMETROPOLITAN AREA SURGERY CENTER 1 | Facility: SURGERY CENTER | Age: 69
End: 2024-11-21

## 2024-11-21 RX ORDER — PANTOPRAZOLE SODIUM 40 MG/1
1 TABLET TABLET, DELAYED RELEASE ORAL ONCE A DAY
Qty: 90 TABLET | Refills: 3 | Status: ACTIVE | COMMUNITY

## 2024-11-21 RX ORDER — DICYCLOMINE HYDROCHLORIDE 10 MG/1
TAKE 1 CAPSULE BY MOUTH THREE TIMES DAILY CAPSULE ORAL
Qty: 90 CAPSULE | Refills: 3 | Status: ACTIVE | COMMUNITY

## 2024-11-21 RX ORDER — ESTRADIOL 2 MG/1
TAKE 1/2 TABLET (2 MG) BY ORAL ROUTE ONCE DAILY BID TABLET ORAL 1
Qty: 0 | Refills: 0 | Status: ACTIVE | COMMUNITY
Start: 1900-01-01

## 2024-11-21 RX ORDER — PANTOPRAZOLE SODIUM 40 MG/1
TAKE 1 TABLET BY MOUTH DAILY TABLET, DELAYED RELEASE ORAL
Qty: 90 TABLET | Refills: 3 | Status: ACTIVE | COMMUNITY

## 2024-11-21 RX ORDER — ZOLPIDEM TARTRATE 10 MG/1
1 TABLET AT BEDTIME AS NEEDED TABLET, FILM COATED ORAL ONCE A DAY
Status: ACTIVE | COMMUNITY

## 2024-11-21 RX ORDER — FLUCONAZOLE 200 MG/1
TAKE 1 TABLET (200 MG) BY ORAL ROUTE ONCE DAILY TABLET ORAL 1
Qty: 0 | Refills: 0 | Status: ACTIVE | COMMUNITY
Start: 1900-01-01

## 2024-11-21 RX ORDER — FAMOTIDINE 40 MG/1
TAKE 1 TABLET BY MOUTH TWICE DAILY TABLET, FILM COATED ORAL
Qty: 180 TABLET | Refills: 3 | Status: ACTIVE | COMMUNITY

## 2024-11-21 RX ORDER — FAMOTIDINE 20 MG/1
TAKE 2 TABLETS BY MOUTH AT BEDTIME TABLET, FILM COATED ORAL
Qty: 180 TABLET | Refills: 3 | Status: ACTIVE | COMMUNITY

## 2024-11-21 RX ORDER — CELECOXIB 200 MG/1
1 CAPSULE WITH FOOD CAPSULE ORAL ONCE A DAY
Status: ACTIVE | COMMUNITY

## 2024-11-21 RX ORDER — FAMOTIDINE 40 MG/1
1 TABLET TABLET, FILM COATED ORAL TWICE A DAY
Qty: 180 TABLET | Refills: 3 | Status: ACTIVE | COMMUNITY

## 2024-11-21 RX ORDER — HYOSCYAMINE SULFATE 0.125 MG
1 TABLET ON THE TONGUE AND ALLOW TO DISSOLVE  AS NEEDED FOR ABDOMINAL PAIN TABLET,DISINTEGRATING ORAL
Qty: 30 LOZENGE | Refills: 6 | Status: ACTIVE | COMMUNITY

## 2025-01-03 ENCOUNTER — OFFICE VISIT (OUTPATIENT)
Dept: URBAN - NONMETROPOLITAN AREA CLINIC 2 | Facility: CLINIC | Age: 70
End: 2025-01-03
Payer: COMMERCIAL

## 2025-01-03 VITALS
DIASTOLIC BLOOD PRESSURE: 88 MMHG | SYSTOLIC BLOOD PRESSURE: 142 MMHG | HEART RATE: 76 BPM | BODY MASS INDEX: 23.49 KG/M2 | WEIGHT: 141 LBS | HEIGHT: 65 IN

## 2025-01-03 DIAGNOSIS — K76.89 LIVER CYST: ICD-10-CM

## 2025-01-03 DIAGNOSIS — Z12.11 COLON CANCER SCREENING: ICD-10-CM

## 2025-01-03 DIAGNOSIS — K58.9 IBS (IRRITABLE BOWEL SYNDROME): ICD-10-CM

## 2025-01-03 DIAGNOSIS — R10.11 RUQ ABDOMINAL PAIN: ICD-10-CM

## 2025-01-03 DIAGNOSIS — Z90.49 HISTORY OF COLON RESECTION: ICD-10-CM

## 2025-01-03 DIAGNOSIS — R10.9 ABDOMINAL PAIN: ICD-10-CM

## 2025-01-03 DIAGNOSIS — K21.9 GASTROESOPHAGEAL REFLUX DISEASE WITHOUT ESOPHAGITIS: ICD-10-CM

## 2025-01-03 PROCEDURE — 99214 OFFICE O/P EST MOD 30 MIN: CPT | Performed by: NURSE PRACTITIONER

## 2025-01-03 RX ORDER — DICYCLOMINE HYDROCHLORIDE 10 MG/1
TAKE 1 CAPSULE BY MOUTH THREE TIMES DAILY CAPSULE ORAL
Qty: 90 CAPSULE | Refills: 3 | Status: ACTIVE | COMMUNITY

## 2025-01-03 RX ORDER — PANTOPRAZOLE SODIUM 40 MG/1
1 TABLET TABLET, DELAYED RELEASE ORAL ONCE A DAY
Qty: 90 TABLET | Refills: 3 | OUTPATIENT

## 2025-01-03 RX ORDER — CELECOXIB 200 MG/1
1 CAPSULE WITH FOOD CAPSULE ORAL ONCE A DAY
Status: ACTIVE | COMMUNITY

## 2025-01-03 RX ORDER — PANTOPRAZOLE SODIUM 40 MG/1
1 TABLET TABLET, DELAYED RELEASE ORAL ONCE A DAY
Qty: 90 TABLET | Refills: 3 | Status: ACTIVE | COMMUNITY

## 2025-01-03 RX ORDER — ZOLPIDEM TARTRATE 10 MG/1
1 TABLET AT BEDTIME AS NEEDED TABLET, FILM COATED ORAL ONCE A DAY
Status: ACTIVE | COMMUNITY

## 2025-01-03 RX ORDER — HYOSCYAMINE SULFATE 0.125 MG
1 TABLET ON THE TONGUE AND ALLOW TO DISSOLVE  AS NEEDED FOR ABDOMINAL PAIN TABLET,DISINTEGRATING ORAL
Qty: 30 LOZENGE | Refills: 6 | Status: ACTIVE | COMMUNITY

## 2025-01-03 RX ORDER — PANTOPRAZOLE SODIUM 40 MG/1
TAKE 1 TABLET BY MOUTH DAILY TABLET, DELAYED RELEASE ORAL
Qty: 90 TABLET | Refills: 3 | Status: ACTIVE | COMMUNITY

## 2025-01-03 RX ORDER — FAMOTIDINE 40 MG/1
1 TABLET TABLET, FILM COATED ORAL TWICE A DAY
Qty: 180 TABLET | Refills: 3 | OUTPATIENT

## 2025-01-03 RX ORDER — FAMOTIDINE 40 MG/1
1 TABLET TABLET, FILM COATED ORAL TWICE A DAY
Qty: 180 TABLET | Refills: 3 | Status: ACTIVE | COMMUNITY

## 2025-01-03 RX ORDER — ESTRADIOL 2 MG/1
TAKE 1/2 TABLET (2 MG) BY ORAL ROUTE ONCE DAILY BID TABLET ORAL 1
Qty: 0 | Refills: 0 | Status: ACTIVE | COMMUNITY
Start: 1900-01-01

## 2025-01-03 RX ORDER — FAMOTIDINE 40 MG/1
TAKE 1 TABLET BY MOUTH TWICE DAILY TABLET, FILM COATED ORAL
Qty: 180 TABLET | Refills: 3 | Status: ACTIVE | COMMUNITY

## 2025-01-03 RX ORDER — FLUCONAZOLE 200 MG/1
TAKE 1 TABLET (200 MG) BY ORAL ROUTE ONCE DAILY TABLET ORAL 1
Qty: 0 | Refills: 0 | Status: ACTIVE | COMMUNITY
Start: 1900-01-01

## 2025-01-03 RX ORDER — RIFAXIMIN 550 MG/1
1 TABLET TABLET ORAL THREE TIMES A DAY
Qty: 42 TABLET | Refills: 0 | OUTPATIENT
Start: 2025-01-03 | End: 2025-01-17

## 2025-01-03 RX ORDER — FAMOTIDINE 20 MG/1
TAKE 2 TABLETS BY MOUTH AT BEDTIME TABLET, FILM COATED ORAL
Qty: 180 TABLET | Refills: 3 | Status: ACTIVE | COMMUNITY

## 2025-01-03 NOTE — HPI-TODAY'S VISIT:
9/21/21 Patient returns for her annual follow-up for IBS-D.  Patient states that she added Florastor to the align and has done much better this year.  She states she has not had to take the Xifaxan at all.  She is currently having a bowel movement daily.  Stools can be loose and somewhat urgent at times.  She is taking Metamucil daily.  She is noted that if she eats "spicy" foods or high fat foods she will develop diarrhea.  She has not had her gallbladder out.  Previous small bowel imaging shows ileal diverticula.  She is status post sigmoid resection for diverticulitis. She has no abdominal pain.  She denies reflux symptoms.  Her appetite and weight are stable.  8/17/22 Ms. Monique is a 67-year-old female previously followed by Dr. Rubio for IBS D and diverticular disease.  She presents today with acute onset 5-hour episode of right upper quad abdominal pain.  It is currently relieved, but she is very fearful of this returning as it was very sharp when it occurred.    She has had a gallbladder ultrasound before and has been unrevealing. 2022 HIDA 92%. sb  1/18/23: Ms. Monique returns for follow-up of RUQ abdominal pain and IBS-D.  Since her last clinic visit, she has been doing ok.  She has difficulties with post-prandial urgency.  Her RUQ pain has resolved itself.  She continues to take both pantoprazole BID and Pepcid and complains of waking up in the early AM with burning in her throat.  11/27/23: Today, Ms Monique returns for GERD. this is well-controlled currently. reports PRN bentyl worked well on her vacation. did have several rounds of antibx and ended up with SIBO symptoms and is s/p xifaxan with relief. SB  10/4/24: Ms. Monique returns to GI clinic for follow-up of chronic GERD and IBS-D symptoms.  She is also s/p sigmoid resection for diverticulitis.  She has taken Xifaxan in the past with improvement and uses Levsin PRN.  She has been on pantoprazole and famotidine daily.  She endorses a LLQ abdominal pain syndrome that is mild but persistent.  She is moving her bowels.  Xifaxan provides a lot of relief for her and she asks for a refill today.  She does have a course of it at her house currently that has been filled.  1/3/25 Aubree returns for f/u of colonoscopy/IBS-D. doing well currently. some increased loose stool recently with slaw. overall, feels well. uses xifaxan prn travel. sb  2024 Colonoscopy- hemorrhoids on perianal exam, diverticulosis in transverse/ascending-repeat in 5 years   3/23 EGD with 3 cm HH, salmon mucosa- but path didn't show barretts. 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

## 2025-05-04 ENCOUNTER — LAB OUTSIDE AN ENCOUNTER (OUTPATIENT)
Dept: URBAN - NONMETROPOLITAN AREA CLINIC 2 | Facility: CLINIC | Age: 70
End: 2025-05-04

## 2025-05-04 ENCOUNTER — CLAIMS CREATED FROM THE CLAIM WINDOW (OUTPATIENT)
Dept: URBAN - METROPOLITAN AREA MEDICAL CENTER 1 | Facility: MEDICAL CENTER | Age: 70
End: 2025-05-04
Payer: MEDICARE

## 2025-05-04 DIAGNOSIS — K57.31 DIVERTICULAR HEMORRHAGE: ICD-10-CM

## 2025-05-04 DIAGNOSIS — Z79.01 ADEQUATE ANTICOAGULATION ON ANTICOAGULANT THERAPY: ICD-10-CM

## 2025-05-04 DIAGNOSIS — D62 ABLA (ACUTE BLOOD LOSS ANEMIA): ICD-10-CM

## 2025-05-04 LAB
HEMATOCRIT: 24.1
HEMOGLOBIN: 8

## 2025-05-04 PROCEDURE — 99222 1ST HOSP IP/OBS MODERATE 55: CPT | Performed by: INTERNAL MEDICINE

## 2025-05-04 PROCEDURE — 99254 IP/OBS CNSLTJ NEW/EST MOD 60: CPT | Performed by: INTERNAL MEDICINE

## 2025-05-04 PROCEDURE — G8427 DOCREV CUR MEDS BY ELIG CLIN: HCPCS | Performed by: INTERNAL MEDICINE

## 2025-05-05 ENCOUNTER — CLAIMS CREATED FROM THE CLAIM WINDOW (OUTPATIENT)
Dept: URBAN - METROPOLITAN AREA MEDICAL CENTER 1 | Facility: MEDICAL CENTER | Age: 70
End: 2025-05-05
Payer: MEDICARE

## 2025-05-05 ENCOUNTER — LAB OUTSIDE AN ENCOUNTER (OUTPATIENT)
Dept: URBAN - NONMETROPOLITAN AREA CLINIC 2 | Facility: CLINIC | Age: 70
End: 2025-05-05

## 2025-05-05 DIAGNOSIS — R93.3 ABN FINDINGS-GI TRACT: ICD-10-CM

## 2025-05-05 DIAGNOSIS — R10.32 ABDOMINAL CRAMPING IN LEFT LOWER QUADRANT: ICD-10-CM

## 2025-05-05 DIAGNOSIS — K57.31 DIVERTICULAR HEMORRHAGE: ICD-10-CM

## 2025-05-05 DIAGNOSIS — D62 ABLA (ACUTE BLOOD LOSS ANEMIA): ICD-10-CM

## 2025-05-05 LAB
HEMATOCRIT: 23
HEMATOCRIT: 23.4
HEMOGLOBIN: 7.8
HEMOGLOBIN: 8

## 2025-05-05 PROCEDURE — 99232 SBSQ HOSP IP/OBS MODERATE 35: CPT | Performed by: REGISTERED NURSE

## 2025-05-06 ENCOUNTER — CLAIMS CREATED FROM THE CLAIM WINDOW (OUTPATIENT)
Dept: URBAN - METROPOLITAN AREA MEDICAL CENTER 1 | Facility: MEDICAL CENTER | Age: 70
End: 2025-05-06
Payer: MEDICARE

## 2025-05-06 DIAGNOSIS — K57.31 DIVERTICULAR HEMORRHAGE: ICD-10-CM

## 2025-05-06 DIAGNOSIS — R10.32 ABDOMINAL CRAMPING IN LEFT LOWER QUADRANT: ICD-10-CM

## 2025-05-06 DIAGNOSIS — D62 ABLA (ACUTE BLOOD LOSS ANEMIA): ICD-10-CM

## 2025-05-06 PROCEDURE — 99232 SBSQ HOSP IP/OBS MODERATE 35: CPT | Performed by: REGISTERED NURSE

## 2025-05-07 ENCOUNTER — CLAIMS CREATED FROM THE CLAIM WINDOW (OUTPATIENT)
Dept: URBAN - METROPOLITAN AREA MEDICAL CENTER 1 | Facility: MEDICAL CENTER | Age: 70
End: 2025-05-07
Payer: MEDICARE

## 2025-05-07 DIAGNOSIS — D62 ABLA (ACUTE BLOOD LOSS ANEMIA): ICD-10-CM

## 2025-05-07 DIAGNOSIS — K57.31 DIVERTICULAR HEMORRHAGE: ICD-10-CM

## 2025-05-07 DIAGNOSIS — R06.02 BREATH SHORTNESS: ICD-10-CM

## 2025-05-07 DIAGNOSIS — R10.32 ABDOMINAL CRAMPING IN LEFT LOWER QUADRANT: ICD-10-CM

## 2025-05-07 PROCEDURE — 99232 SBSQ HOSP IP/OBS MODERATE 35: CPT | Performed by: REGISTERED NURSE

## 2025-05-08 ENCOUNTER — CLAIMS CREATED FROM THE CLAIM WINDOW (OUTPATIENT)
Dept: URBAN - METROPOLITAN AREA MEDICAL CENTER 1 | Facility: MEDICAL CENTER | Age: 70
End: 2025-05-08
Payer: MEDICARE

## 2025-05-08 DIAGNOSIS — D62 ABLA (ACUTE BLOOD LOSS ANEMIA): ICD-10-CM

## 2025-05-08 DIAGNOSIS — K57.31 DIVERTICULAR HEMORRHAGE: ICD-10-CM

## 2025-05-08 DIAGNOSIS — R10.32 ABDOMINAL CRAMPING IN LEFT LOWER QUADRANT: ICD-10-CM

## 2025-05-08 PROCEDURE — 99232 SBSQ HOSP IP/OBS MODERATE 35: CPT | Performed by: REGISTERED NURSE

## 2025-07-11 ENCOUNTER — OFFICE VISIT (OUTPATIENT)
Dept: URBAN - NONMETROPOLITAN AREA CLINIC 2 | Facility: CLINIC | Age: 70
End: 2025-07-11

## 2025-07-22 ENCOUNTER — OFFICE VISIT (OUTPATIENT)
Age: 70
End: 2025-07-22
Payer: MEDICARE

## 2025-07-22 DIAGNOSIS — R19.5 LOOSE STOOLS: ICD-10-CM

## 2025-07-22 DIAGNOSIS — K76.89 LIVER CYST: ICD-10-CM

## 2025-07-22 DIAGNOSIS — K58.9 IBS (IRRITABLE BOWEL SYNDROME): ICD-10-CM

## 2025-07-22 DIAGNOSIS — K21.9 GASTROESOPHAGEAL REFLUX DISEASE WITHOUT ESOPHAGITIS: ICD-10-CM

## 2025-07-22 DIAGNOSIS — R10.11 RUQ ABDOMINAL PAIN: ICD-10-CM

## 2025-07-22 DIAGNOSIS — Z90.49 HISTORY OF COLON RESECTION: ICD-10-CM

## 2025-07-22 DIAGNOSIS — Z12.11 COLON CANCER SCREENING: ICD-10-CM

## 2025-07-22 DIAGNOSIS — K57.91 GASTROINTESTINAL HEMORRHAGE ASSOCIATED WITH INTESTINAL DIVERTICULOSIS: ICD-10-CM

## 2025-07-22 DIAGNOSIS — R10.33 ABDOMINAL PAIN, ACUTE, PERIUMBILICAL: ICD-10-CM

## 2025-07-22 PROCEDURE — 99214 OFFICE O/P EST MOD 30 MIN: CPT | Performed by: NURSE PRACTITIONER

## 2025-07-22 RX ORDER — PANTOPRAZOLE SODIUM 40 MG/1
TAKE 1 TABLET BY MOUTH DAILY TABLET, DELAYED RELEASE ORAL
Qty: 90 TABLET | Refills: 3 | Status: ACTIVE | COMMUNITY

## 2025-07-22 RX ORDER — ESTRADIOL 2 MG/1
TAKE 1/2 TABLET (2 MG) BY ORAL ROUTE ONCE DAILY BID TABLET ORAL 1
Qty: 0 | Refills: 0 | Status: ACTIVE | COMMUNITY
Start: 1900-01-01

## 2025-07-22 RX ORDER — PANTOPRAZOLE SODIUM 40 MG/1
1 TABLET TABLET, DELAYED RELEASE ORAL ONCE A DAY
Qty: 90 TABLET | Refills: 3 | Status: ACTIVE | COMMUNITY

## 2025-07-22 RX ORDER — ZOLPIDEM TARTRATE 10 MG/1
1 TABLET AT BEDTIME AS NEEDED TABLET, FILM COATED ORAL ONCE A DAY
Status: ACTIVE | COMMUNITY

## 2025-07-22 RX ORDER — COLESTIPOL HYDROCHLORIDE 1 G/1
2 TABLETS TABLET, FILM COATED ORAL ONCE A DAY
Qty: 60 | Refills: 11 | OUTPATIENT
Start: 2025-07-22

## 2025-07-22 RX ORDER — FAMOTIDINE 40 MG/1
1 TABLET TABLET, FILM COATED ORAL TWICE A DAY
Qty: 180 TABLET | Refills: 3 | Status: ACTIVE | COMMUNITY

## 2025-07-22 RX ORDER — FAMOTIDINE 40 MG/1
1 TABLET TABLET, FILM COATED ORAL TWICE A DAY
Qty: 180 TABLET | Refills: 3 | OUTPATIENT
Start: 2025-07-22

## 2025-07-22 RX ORDER — FAMOTIDINE 40 MG/1
TAKE 1 TABLET BY MOUTH TWICE DAILY TABLET, FILM COATED ORAL
Qty: 180 TABLET | Refills: 3 | Status: ACTIVE | COMMUNITY

## 2025-07-22 RX ORDER — CELECOXIB 200 MG/1
1 CAPSULE WITH FOOD CAPSULE ORAL ONCE A DAY
Status: ACTIVE | COMMUNITY

## 2025-07-22 RX ORDER — HYOSCYAMINE SULFATE 0.125 MG
1 TABLET ON THE TONGUE AND ALLOW TO DISSOLVE  AS NEEDED FOR ABDOMINAL PAIN TABLET,DISINTEGRATING ORAL
Qty: 30 LOZENGE | Refills: 6 | Status: ACTIVE | COMMUNITY

## 2025-07-22 RX ORDER — PANTOPRAZOLE SODIUM 40 MG/1
1 TABLET TABLET, DELAYED RELEASE ORAL ONCE A DAY
Qty: 90 TABLET | Refills: 3 | OUTPATIENT
Start: 2025-07-22

## 2025-07-22 RX ORDER — FAMOTIDINE 20 MG/1
TAKE 2 TABLETS BY MOUTH AT BEDTIME TABLET, FILM COATED ORAL
Qty: 180 TABLET | Refills: 3 | Status: ACTIVE | COMMUNITY

## 2025-07-22 RX ORDER — DICYCLOMINE HYDROCHLORIDE 10 MG/1
TAKE 1 CAPSULE BY MOUTH THREE TIMES DAILY CAPSULE ORAL
Qty: 90 CAPSULE | Refills: 3 | Status: ACTIVE | COMMUNITY

## 2025-07-22 RX ORDER — FLUCONAZOLE 200 MG/1
TAKE 1 TABLET (200 MG) BY ORAL ROUTE ONCE DAILY TABLET ORAL 1
Qty: 0 | Refills: 0 | Status: ACTIVE | COMMUNITY
Start: 1900-01-01

## 2025-07-22 NOTE — HPI-OTHER HISTORIES
2024 Colonoscopy- hemorrhoids on perianal exam, diverticulosis in transverse/ascending-repeat in 5 years   3/23 EGD with 3 cm HH, salmon mucosa- but path didn't show barretts. 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

## 2025-07-22 NOTE — HPI-TODAY'S VISIT:
9/21/21 Patient returns for her annual follow-up for IBS-D.  Patient states that she added Florastor to the align and has done much better this year.  She states she has not had to take the Xifaxan at all.  She is currently having a bowel movement daily.  Stools can be loose and somewhat urgent at times.  She is taking Metamucil daily.  She is noted that if she eats "spicy" foods or high fat foods she will develop diarrhea.  She has not had her gallbladder out.  Previous small bowel imaging shows ileal diverticula.  She is status post sigmoid resection for diverticulitis. She has no abdominal pain.  She denies reflux symptoms.  Her appetite and weight are stable.  8/17/22 Ms. Monique is a 67-year-old female previously followed by Dr. Rubio for IBS D and diverticular disease.  She presents today with acute onset 5-hour episode of right upper quad abdominal pain.  It is currently relieved, but she is very fearful of this returning as it was very sharp when it occurred.    She has had a gallbladder ultrasound before and has been unrevealing. 2022 HIDA 92%. sb  1/18/23: Ms. Monique returns for follow-up of RUQ abdominal pain and IBS-D.  Since her last clinic visit, she has been doing ok.  She has difficulties with post-prandial urgency.  Her RUQ pain has resolved itself.  She continues to take both pantoprazole BID and Pepcid and complains of waking up in the early AM with burning in her throat.  11/27/23: Today, Ms Monique returns for GERD. this is well-controlled currently. reports PRN bentyl worked well on her vacation. did have several rounds of antibx and ended up with SIBO symptoms and is s/p xifaxan with relief. SB  10/4/24: Ms. Monique returns to GI clinic for follow-up of chronic GERD and IBS-D symptoms.  She is also s/p sigmoid resection for diverticulitis.  She has taken Xifaxan in the past with improvement and uses Levsin PRN.  She has been on pantoprazole and famotidine daily.  She endorses a LLQ abdominal pain syndrome that is mild but persistent.  She is moving her bowels.  Xifaxan provides a lot of relief for her and she asks for a refill today.  She does have a course of it at her house currently that has been filled.  1/3/25 Aubree returns for f/u of colonoscopy/IBS-D. doing well currently. some increased loose stool recently with slaw. overall, feels well. uses xifaxan prn travel. sb  7/22/25 returns for f/u of IBS and recent GI bleed admitted to [facility] in may with likely diverticular bleed. was just started on eliquis 4 days prior for cardioversion went to IR with successful embolization. However, was dx with ischemia and had to undergo emergent R colectomcy with surgicalist.  developed anastomotic leak, SBO, internal hernia causing obstruction. went to OR for this as well and had to have open wound with wound vac.  now doing well, but loose stool. cardiology wishes to resume anticoag for watchman but she is very worried about this. slight anemia and on oral iron. sb

## 2025-08-04 ENCOUNTER — LAB OUTSIDE AN ENCOUNTER (OUTPATIENT)
Dept: URBAN - NONMETROPOLITAN AREA CLINIC 2 | Facility: CLINIC | Age: 70
End: 2025-08-04

## 2025-08-05 ENCOUNTER — TELEPHONE ENCOUNTER (OUTPATIENT)
Dept: URBAN - NONMETROPOLITAN AREA CLINIC 2 | Facility: CLINIC | Age: 70
End: 2025-08-05

## 2025-08-05 LAB
ABSOLUTE BASOPHILS: 38
ABSOLUTE EOSINOPHILS: 167
ABSOLUTE LYMPHOCYTES: 1391
ABSOLUTE MONOCYTES: 445
ABSOLUTE NEUTROPHILS: 1759
BASOPHILS: 1
EOSINOPHILS: 4.4
HEMATOCRIT: 36.4
HEMOGLOBIN: 11.7
IRON BIND.CAP.(TIBC): 426
IRON SATURATION: 23
IRON: 97
LYMPHOCYTES: 36.6
MAGNESIUM: 2.1
MCH: 30.2
MCHC: 32.1
MCV: 93.8
MONOCYTES: 11.7
MPV: 9.8
NEUTROPHILS: 46.3
PLATELET COUNT: 206
RDW: 17.2
RED BLOOD CELL COUNT: 3.88
WHITE BLOOD CELL COUNT: 3.8